# Patient Record
Sex: FEMALE | Race: BLACK OR AFRICAN AMERICAN | Employment: FULL TIME
[De-identification: names, ages, dates, MRNs, and addresses within clinical notes are randomized per-mention and may not be internally consistent; named-entity substitution may affect disease eponyms.]

---

## 2023-05-15 ENCOUNTER — TELEPHONE (OUTPATIENT)
Facility: CLINIC | Age: 33
End: 2023-05-15

## 2023-05-15 NOTE — TELEPHONE ENCOUNTER
----- Message from Harris Luis sent at 5/11/2023 12:47 PM EDT -----  Subject: Appointment Request    Reason for Call: New Patient/New to Provider Appointment needed: Routine   ED Follow Up Visit    QUESTIONS    Reason for appointment request? No appointments available during search     Additional Information for Provider?  Patient would like to establish here   for ER f/u with anemia/leg swelling - no appointments available; screened   green  ---------------------------------------------------------------------------  --------------  [id number]  [phone]; OK to leave message on voicemail  ---------------------------------------------------------------------------  --------------  SCRIPT ANSWERS  LUIS Screen: Maddi Fishman

## 2023-05-15 NOTE — TELEPHONE ENCOUNTER
Called patient left message to call office to schedule a new patient appointment. Patient would like ER f/up anemia/leg swelling with new patient appointment.

## 2023-05-16 NOTE — TELEPHONE ENCOUNTER
Called patient to call office to schedule a new patient appointment. Mailbox full.  Patient would like ER f/up anemia/leg swelling with new patient appointment